# Patient Record
Sex: FEMALE | Race: WHITE | URBAN - METROPOLITAN AREA
[De-identification: names, ages, dates, MRNs, and addresses within clinical notes are randomized per-mention and may not be internally consistent; named-entity substitution may affect disease eponyms.]

---

## 2024-03-05 RX ORDER — MULTIVIT-MIN/IRON/FOLIC ACID/K 18-600-40
1 CAPSULE ORAL DAILY
COMMUNITY

## 2024-03-05 RX ORDER — CHOLECALCIFEROL (VITAMIN D3) 25 MCG
1 TABLET,CHEWABLE ORAL DAILY
COMMUNITY

## 2024-04-02 NOTE — H&P
[facility]   part of [facility]    History and Physical    Dolly Angel Patient Status:  Hospital Outpatient Surgery    1990 MRN [id number]   Location [facility] SURGERY Attending Edward Rogers, Honorio DEL VALLE MD   Hosp Day # 0 PCP PHYSICIAN NONSTAFF     Date:  2024  Date of Admission:  (Not on file)    History provided by:patient  HPI:   No chief complaint on file.    Patient is a 34 yo  F with pelvic pain and endometriosis presenting for surgical intervention.        History     Past Medical History:   Diagnosis Date    History of Achilles tendon repair     History of back surgery     History of eye surgery     Hx of motion sickness     PONV (postoperative nausea and vomiting)     Varicella without mention of complication      Past Surgical History:   Procedure Laterality Date    EYE SURGERY      three times    HYSTEROSCOPY      LAPAROSCOPY,DIAGNOSTIC      LEG/ANKLE SURGERY PROC UNLISTED Right     right ankle ligament repair    SPINE SURGERY PROCEDURE UNLISTED      Scoliosis     History reviewed. No pertinent family history.  Social History:  Social History     Tobacco Use    Smoking status: Never    Smokeless tobacco: Never   Vaping Use    Vaping Use: Never used   Substance and Sexual Activity    Alcohol use: Yes     Comment: Rarely    Drug use: Never   Social History Narrative    ** Merged History Encounter **          Allergies/Medications:   Allergies: No Known Allergies  No medications prior to admission.       Review of Systems:     All other systems reviewed and are negative.      Physical Exam:   Vital Signs:  Height 5' 2\" (1.575 m), weight 125 lb (56.7 kg), last menstrual period 2024.  Physical Exam  Constitutional:       Appearance: Normal appearance.   HENT:      Head: Normocephalic and atraumatic.   Cardiovascular:      Rate and Rhythm: Normal rate and regular rhythm.   Pulmonary:      Effort: Pulmonary effort is normal.      Breath sounds: Normal breath sounds.    Abdominal:      General: Abdomen is flat.      Palpations: Abdomen is soft.   Musculoskeletal:         General: Normal range of motion.   Skin:     General: Skin is warm and dry.   Neurological:      General: No focal deficit present.      Mental Status: She is alert and oriented to person, place, and time. Mental status is at baseline.   Psychiatric:         Mood and Affect: Mood normal.         Behavior: Behavior normal.         Thought Content: Thought content normal.         Judgment: Judgment normal.       Cervical Papanicolaou done within 1 year of adm    Results:   No results found for: \"WBC\", \"HGB\", \"HCT\", \"PLT\", \"CREATSERUM\", \"BUN\", \"NA\", \"K\", \"CL\", \"CO2\", \"GLU\", \"CA\", \"ALB\", \"ALKPHO\", \"BILT\", \"TP\", \"AST\", \"ALT\", \"PTT\", \"INR\", \"PT\", \"T4F\", \"TSH\", \"TSHREFLEX\", \"MARQUEZ\", \"LIP\", \"GGT\", \"PSA\", \"DDIMER\", \"ESRML\", \"ESRPF\", \"CRP\", \"BNP\", \"MG\", \"PHOS\", \"TROP\", \"TROPHS\", \"CK\", \"CKMB\", \"BERTRAND\", \"RPR\", \"B12\", \"ETOH\", \"POCGLU\"  No results found.        Assessment/Plan:   Patient is a 34 yo  F with pelvic pain and endometriosis presenting for surgical intervention.    Plan for hysteroscopy, laparoscopy, excision of endometriosis, tubal lavage, doubtful cannulation of the left fallopian tube     Albert Jansen PA-C  2024

## 2024-04-03 ENCOUNTER — HOSPITAL ENCOUNTER (OUTPATIENT)
Facility: HOSPITAL | Age: 34
Setting detail: HOSPITAL OUTPATIENT SURGERY
Discharge: HOME | End: 2024-04-03
Attending: OBSTETRICS & GYNECOLOGY | Admitting: OBSTETRICS & GYNECOLOGY
Payer: COMMERCIAL

## 2024-04-03 ENCOUNTER — ANESTHESIA EVENT (OUTPATIENT)
Dept: SURGERY | Facility: HOSPITAL | Age: 34
End: 2024-04-03
Payer: COMMERCIAL

## 2024-04-03 ENCOUNTER — ANESTHESIA (OUTPATIENT)
Dept: SURGERY | Facility: HOSPITAL | Age: 34
End: 2024-04-03
Payer: COMMERCIAL

## 2024-04-03 VITALS
DIASTOLIC BLOOD PRESSURE: 59 MMHG | SYSTOLIC BLOOD PRESSURE: 105 MMHG | OXYGEN SATURATION: 99 % | RESPIRATION RATE: 15 BRPM | BODY MASS INDEX: 21.53 KG/M2 | HEIGHT: 62 IN | WEIGHT: 117 LBS | HEART RATE: 73 BPM | TEMPERATURE: 97 F

## 2024-04-03 DIAGNOSIS — G89.18 ACUTE POST-OPERATIVE PAIN: Primary | ICD-10-CM

## 2024-04-03 LAB — B-HCG UR QL: NEGATIVE

## 2024-04-03 PROCEDURE — 0UBF8ZZ EXCISION OF CUL-DE-SAC, VIA NATURAL OR ARTIFICIAL OPENING ENDOSCOPIC: ICD-10-PCS | Performed by: OBSTETRICS & GYNECOLOGY

## 2024-04-03 PROCEDURE — 0UB98ZZ EXCISION OF UTERUS, VIA NATURAL OR ARTIFICIAL OPENING ENDOSCOPIC: ICD-10-PCS | Performed by: OBSTETRICS & GYNECOLOGY

## 2024-04-03 PROCEDURE — 3E0P8KZ INTRODUCTION OF OTHER DIAGNOSTIC SUBSTANCE INTO FEMALE REPRODUCTIVE, VIA NATURAL OR ARTIFICIAL OPENING ENDOSCOPIC: ICD-10-PCS | Performed by: OBSTETRICS & GYNECOLOGY

## 2024-04-03 PROCEDURE — 81025 URINE PREGNANCY TEST: CPT

## 2024-04-03 PROCEDURE — 88305 TISSUE EXAM BY PATHOLOGIST: CPT | Performed by: OBSTETRICS & GYNECOLOGY

## 2024-04-03 RX ORDER — HYDROMORPHONE HYDROCHLORIDE 1 MG/ML
0.6 INJECTION, SOLUTION INTRAMUSCULAR; INTRAVENOUS; SUBCUTANEOUS EVERY 5 MIN PRN
Status: DISCONTINUED | OUTPATIENT
Start: 2024-04-03 | End: 2024-04-03

## 2024-04-03 RX ORDER — PHENAZOPYRIDINE HYDROCHLORIDE 200 MG/1
200 TABLET, FILM COATED ORAL ONCE
Status: COMPLETED | OUTPATIENT
Start: 2024-04-03 | End: 2024-04-03

## 2024-04-03 RX ORDER — BUPIVACAINE HYDROCHLORIDE 5 MG/ML
INJECTION, SOLUTION EPIDURAL; INTRACAUDAL AS NEEDED
Status: DISCONTINUED | OUTPATIENT
Start: 2024-04-03 | End: 2024-04-03

## 2024-04-03 RX ORDER — GLYCOPYRROLATE 0.2 MG/ML
INJECTION, SOLUTION INTRAMUSCULAR; INTRAVENOUS AS NEEDED
Status: DISCONTINUED | OUTPATIENT
Start: 2024-04-03 | End: 2024-04-03 | Stop reason: SURG

## 2024-04-03 RX ORDER — PROCHLORPERAZINE EDISYLATE 5 MG/ML
5 INJECTION INTRAMUSCULAR; INTRAVENOUS EVERY 8 HOURS PRN
Status: DISCONTINUED | OUTPATIENT
Start: 2024-04-03 | End: 2024-04-03

## 2024-04-03 RX ORDER — CEFAZOLIN SODIUM/WATER 2 G/20 ML
SYRINGE (ML) INTRAVENOUS AS NEEDED
Status: DISCONTINUED | OUTPATIENT
Start: 2024-04-03 | End: 2024-04-03 | Stop reason: SURG

## 2024-04-03 RX ORDER — ONDANSETRON 2 MG/ML
4 INJECTION INTRAMUSCULAR; INTRAVENOUS EVERY 6 HOURS PRN
Status: DISCONTINUED | OUTPATIENT
Start: 2024-04-03 | End: 2024-04-03

## 2024-04-03 RX ORDER — HYDROMORPHONE HYDROCHLORIDE 1 MG/ML
0.2 INJECTION, SOLUTION INTRAMUSCULAR; INTRAVENOUS; SUBCUTANEOUS EVERY 5 MIN PRN
Status: DISCONTINUED | OUTPATIENT
Start: 2024-04-03 | End: 2024-04-03

## 2024-04-03 RX ORDER — NEOSTIGMINE METHYLSULFATE 1 MG/ML
INJECTION, SOLUTION INTRAVENOUS AS NEEDED
Status: DISCONTINUED | OUTPATIENT
Start: 2024-04-03 | End: 2024-04-03 | Stop reason: SURG

## 2024-04-03 RX ORDER — HYDROMORPHONE HYDROCHLORIDE 1 MG/ML
0.4 INJECTION, SOLUTION INTRAMUSCULAR; INTRAVENOUS; SUBCUTANEOUS EVERY 5 MIN PRN
Status: DISCONTINUED | OUTPATIENT
Start: 2024-04-03 | End: 2024-04-03

## 2024-04-03 RX ORDER — ONDANSETRON 2 MG/ML
INJECTION INTRAMUSCULAR; INTRAVENOUS AS NEEDED
Status: DISCONTINUED | OUTPATIENT
Start: 2024-04-03 | End: 2024-04-03 | Stop reason: SURG

## 2024-04-03 RX ORDER — SODIUM CHLORIDE, SODIUM LACTATE, POTASSIUM CHLORIDE, CALCIUM CHLORIDE 600; 310; 30; 20 MG/100ML; MG/100ML; MG/100ML; MG/100ML
INJECTION, SOLUTION INTRAVENOUS CONTINUOUS
Status: DISCONTINUED | OUTPATIENT
Start: 2024-04-03 | End: 2024-04-03

## 2024-04-03 RX ORDER — ONDANSETRON 8 MG/1
8 TABLET, ORALLY DISINTEGRATING ORAL EVERY 8 HOURS PRN
Qty: 10 TABLET | Refills: 0 | Status: SHIPPED | OUTPATIENT
Start: 2024-04-03

## 2024-04-03 RX ORDER — NALOXONE HYDROCHLORIDE 0.4 MG/ML
0.08 INJECTION, SOLUTION INTRAMUSCULAR; INTRAVENOUS; SUBCUTANEOUS AS NEEDED
Status: DISCONTINUED | OUTPATIENT
Start: 2024-04-03 | End: 2024-04-03

## 2024-04-03 RX ORDER — MIDAZOLAM HYDROCHLORIDE 1 MG/ML
INJECTION INTRAMUSCULAR; INTRAVENOUS AS NEEDED
Status: DISCONTINUED | OUTPATIENT
Start: 2024-04-03 | End: 2024-04-03 | Stop reason: SURG

## 2024-04-03 RX ORDER — HYDROCODONE BITARTRATE AND ACETAMINOPHEN 5; 325 MG/1; MG/1
1 TABLET ORAL ONCE AS NEEDED
Status: DISCONTINUED | OUTPATIENT
Start: 2024-04-03 | End: 2024-04-03

## 2024-04-03 RX ORDER — OXYCODONE HYDROCHLORIDE 5 MG/1
5 TABLET ORAL EVERY 4 HOURS PRN
Qty: 10 TABLET | Refills: 0 | Status: SHIPPED | OUTPATIENT
Start: 2024-04-03

## 2024-04-03 RX ORDER — ROCURONIUM BROMIDE 10 MG/ML
INJECTION, SOLUTION INTRAVENOUS AS NEEDED
Status: DISCONTINUED | OUTPATIENT
Start: 2024-04-03 | End: 2024-04-03 | Stop reason: SURG

## 2024-04-03 RX ORDER — HYDROCODONE BITARTRATE AND ACETAMINOPHEN 5; 325 MG/1; MG/1
2 TABLET ORAL ONCE AS NEEDED
Status: DISCONTINUED | OUTPATIENT
Start: 2024-04-03 | End: 2024-04-03

## 2024-04-03 RX ORDER — ACETAMINOPHEN 500 MG
1000 TABLET ORAL ONCE AS NEEDED
Status: DISCONTINUED | OUTPATIENT
Start: 2024-04-03 | End: 2024-04-03

## 2024-04-03 RX ORDER — DEXAMETHASONE SODIUM PHOSPHATE 4 MG/ML
VIAL (ML) INJECTION AS NEEDED
Status: DISCONTINUED | OUTPATIENT
Start: 2024-04-03 | End: 2024-04-03 | Stop reason: SURG

## 2024-04-03 RX ORDER — ACETAMINOPHEN 500 MG
1000 TABLET ORAL ONCE
Status: DISCONTINUED | OUTPATIENT
Start: 2024-04-03 | End: 2024-04-03 | Stop reason: HOSPADM

## 2024-04-03 RX ORDER — SCOLOPAMINE TRANSDERMAL SYSTEM 1 MG/1
1 PATCH, EXTENDED RELEASE TRANSDERMAL ONCE
Status: DISCONTINUED | OUTPATIENT
Start: 2024-04-03 | End: 2024-04-03 | Stop reason: HOSPADM

## 2024-04-03 RX ORDER — LIDOCAINE HYDROCHLORIDE 10 MG/ML
INJECTION, SOLUTION EPIDURAL; INFILTRATION; INTRACAUDAL; PERINEURAL AS NEEDED
Status: DISCONTINUED | OUTPATIENT
Start: 2024-04-03 | End: 2024-04-03 | Stop reason: SURG

## 2024-04-03 RX ADMIN — DEXAMETHASONE SODIUM PHOSPHATE 4 MG: 4 MG/ML VIAL (ML) INJECTION at 10:28:00

## 2024-04-03 RX ADMIN — NEOSTIGMINE METHYLSULFATE 4 MG: 1 INJECTION, SOLUTION INTRAVENOUS at 11:01:00

## 2024-04-03 RX ADMIN — SODIUM CHLORIDE, SODIUM LACTATE, POTASSIUM CHLORIDE, CALCIUM CHLORIDE: 600; 310; 30; 20 INJECTION, SOLUTION INTRAVENOUS at 10:11:00

## 2024-04-03 RX ADMIN — CEFAZOLIN SODIUM/WATER 2 G: 2 G/20 ML SYRINGE (ML) INTRAVENOUS at 10:29:00

## 2024-04-03 RX ADMIN — GLYCOPYRROLATE 0.6 MG: 0.2 INJECTION, SOLUTION INTRAMUSCULAR; INTRAVENOUS at 11:01:00

## 2024-04-03 RX ADMIN — SODIUM CHLORIDE, SODIUM LACTATE, POTASSIUM CHLORIDE, CALCIUM CHLORIDE: 600; 310; 30; 20 INJECTION, SOLUTION INTRAVENOUS at 10:51:00

## 2024-04-03 RX ADMIN — ROCURONIUM BROMIDE 40 MG: 10 INJECTION, SOLUTION INTRAVENOUS at 10:17:00

## 2024-04-03 RX ADMIN — MIDAZOLAM HYDROCHLORIDE 2 MG: 1 INJECTION INTRAMUSCULAR; INTRAVENOUS at 10:15:00

## 2024-04-03 RX ADMIN — LIDOCAINE HYDROCHLORIDE 50 MG: 10 INJECTION, SOLUTION EPIDURAL; INFILTRATION; INTRACAUDAL; PERINEURAL at 10:17:00

## 2024-04-03 RX ADMIN — ONDANSETRON 4 MG: 2 INJECTION INTRAMUSCULAR; INTRAVENOUS at 10:28:00

## 2024-04-03 RX ADMIN — SODIUM CHLORIDE, SODIUM LACTATE, POTASSIUM CHLORIDE, CALCIUM CHLORIDE: 600; 310; 30; 20 INJECTION, SOLUTION INTRAVENOUS at 11:21:00

## 2024-04-03 NOTE — INTERVAL H&P NOTE
Pre-op Diagnosis: PELVIC PAIN    The above referenced H&P was reviewed by Albert Jansen PA-C on 4/3/2024, the patient was examined and no significant changes have occurred in the patient's condition since the H&P was performed.  I discussed with the patient and/or legal representative the potential benefits, risks and side effects of this procedure; the likelihood of the patient achieving goals; and potential problems that might occur during recuperation.  I discussed reasonable alternatives to the procedure, including risks, benefits and side effects related to the alternatives and risks related to not receiving this procedure.  We will proceed with procedure as planned.

## 2024-04-03 NOTE — ANESTHESIA PREPROCEDURE EVALUATION
PRE-OP EVALUATION    Patient Name: Dolly Rosado    Admit Diagnosis: PELVIC PAIN    Pre-op Diagnosis: PELVIC PAIN    HYSTEROSCOPY, LAPAROSCOPY, EXCISION OF ENDOMETRIOSIS, POSSIBLE CANNULATION LEFT FALLOPIAN TUBE    Anesthesia Procedure: HYSTEROSCOPY, LAPAROSCOPY, EXCISION OF ENDOMETRIOSIS, POSSIBLE CANNULATION LEFT FALLOPIAN TUBE (Left)    Surgeon(s) and Role:     * Edward Rogers, Honorio DEL VALLE MD - Primary     * Leticia Zaman MD - Assisting Surgeon    Pre-op vitals reviewed.  Temp: 97.9 °F (36.6 °C)  Pulse: 77  Resp: 16  BP: 105/75  SpO2: 100 %  Body mass index is 21.4 kg/m².    Current medications reviewed.  Hospital Medications:  • [MAR Hold] acetaminophen (Tylenol Extra Strength) tab 1,000 mg  1,000 mg Oral Once   • [MAR Hold] scopolamine (Transderm-Scop) 1 MG/3DAYS patch 1 patch  1 patch Transdermal Once   • lactated ringers infusion   Intravenous Continuous   • [COMPLETED] phenazopyridine (Pyridum) tab 200 mg  200 mg Oral Once       Outpatient Medications:     Medications Prior to Admission   Medication Sig Dispense Refill Last Dose   • prenatal vitamin with DHA 27-0.8-228 MG Oral Cap Take 1 capsule by mouth daily.   Past Week   • Coenzyme Q10 (COQ10 OR) Take 1 capsule by mouth daily.   4/2/2024   • Cholecalciferol (VITAMIN D) 50 MCG (2000 UT) Oral Cap Take 1 capsule (2,000 Units total) by mouth daily.   Past Month   • COLLAGEN OR Take 1 capsule by mouth daily.      • Gamma-Aminobutyric Acid (TYLER OR) Take 1 capsule by mouth daily.   4/2/2024   • LANEY OR daily          Allergies: Patient has no known allergies.      Anesthesia Evaluation    Patient summary reviewed.    Anesthetic Complications  (+) history of anesthetic complications  History of: PONV       GI/Hepatic/Renal    Negative GI/hepatic/renal ROS.                             Cardiovascular    Negative cardiovascular ROS.                                                   Endo/Other  Comment: Pelvic pain for hysteroscopy and laparoscopy                                 Pulmonary    Negative pulmonary ROS.                       Neuro/Psych  Comment: H/o spine surgery                                  Past Surgical History:   Procedure Laterality Date   • EYE SURGERY      three times   • HYSTEROSCOPY     • LAPAROSCOPY,DIAGNOSTIC  2007   • LEG/ANKLE SURGERY PROC UNLISTED Right     right ankle ligament repair   • SPINE SURGERY PROCEDURE UNLISTED      Scoliosis     Social History     Socioeconomic History   • Marital status:    Tobacco Use   • Smoking status: Never   • Smokeless tobacco: Never   Vaping Use   • Vaping Use: Never used   Substance and Sexual Activity   • Alcohol use: Yes     Comment: Rarely   • Drug use: Never     History   Drug Use Unknown     Available pre-op labs reviewed.               Airway      Mallampati: II  Mouth opening: >3 FB  TM distance: > 6 cm  Neck ROM: full Cardiovascular    Cardiovascular exam normal.  Rhythm: regular  Rate: normal  (-) murmur   Dental    Dentition appears grossly intact         Pulmonary    Pulmonary exam normal.  Breath sounds clear to auscultation bilaterally.               Other findings          ASA: 2   Plan: general  NPO status verified and patient meets guidelines.        Comment: GA with OETT/LMA explained to patient. Risks/benefits explained including but not limited to sore throat, hoarse voice, nausea, dental trauma, eye injury,pulmonary complication and other complications as discussed in anesthesia consent form. Patient agrees to proceed. Anesthesia consent signed.     Plan/risks discussed with: patient            Present on Admission:  **None**

## 2024-04-03 NOTE — BRIEF OP NOTE
Pre-Operative Diagnosis: PELVIC PAIN     Post-Operative Diagnosis: PELVIC PAIN      Procedure Performed:   HYSTEROSCOPY, POLYPECTOMY, LAPAROSCOPY, EXCISION OF ENDOMETRIOSIS, TUBAL LAVAGE    Surgeon(s) and Role:     * Edward Rogers, Honorio DEL VALLE MD - Primary     * Leticia Zaman MD - Assisting Surgeon    Assistant(s):  PA: Albert Jansen PA-C     Surgical Findings: see post op diagnosis     Specimen: polyp, endometriosis     Estimated Blood Loss: Blood Output: 5 mL (4/3/2024 10:59 AM)          Albert Jansen PA-C  4/3/2024  11:02 AM

## 2024-04-03 NOTE — ANESTHESIA POSTPROCEDURE EVALUATION
[facility]    Dolly Rosado Patient Status:  Hospital Outpatient Surgery   Age/Gender 33 year old female MRN [id number]   Location [facility] SURGERY Attending Edward Rogers, Honorio DEL VALLE MD   Hosp Day # 0 PCP PHYSICIAN NONSTAFF       Anesthesia Post-op Note    HYSTEROSCOPY, POLYPECTOMY, LAPAROSCOPY, EXCISION OF ENDOMETRIOSIS, TUBAL LAVAGE    Procedure Summary       Date: 04/03/24 Room / Location:  MAIN OR 14 / [facility] MAIN OR    Anesthesia Start: 1011 Anesthesia Stop: 1121    Procedure: HYSTEROSCOPY, POLYPECTOMY, LAPAROSCOPY, EXCISION OF ENDOMETRIOSIS, TUBAL LAVAGE Diagnosis: (PELVIC PAIN)    Surgeons: Edward Rogers, Honorio DEL VALLE MD Anesthesiologist: Jered Parekh MD    Anesthesia Type: general ASA Status: 2            Anesthesia Type: general    Vitals Value Taken Time   /71 04/03/24 1122   Temp 98.6 04/03/24 1122   Pulse 100 04/03/24 1122   Resp 18 04/03/24 1122   SpO2 100 04/03/24 1122       Patient Location: PACU    Anesthesia Type: general    Airway Patency: patent and extubated    Postop Pain Control: adequate    Mental Status: preanesthetic baseline    Nausea/Vomiting: none    Cardiopulmonary/Hydration status: stable euvolemic    Complications: no apparent anesthesia related complications    Postop vital signs: stable    Dental Exam: Unchanged from Preop    Patient to be discharged from PACU when criteria met.

## 2024-04-03 NOTE — OPERATIVE REPORT
[facility]    PATIENT'S NAME: LINA LYNCH   ATTENDING PHYSICIAN: Honorio Leon M.D.   OPERATING PHYSICIAN: Honorio Leon M.D.   PATIENT ACCOUNT#:   [id number]    LOCATION:  [facility]  MEDICAL RECORD #:   [id number]       YOB: 1990  ADMISSION DATE:       04/03/2024      OPERATION DATE:  04/03/2024    OPERATIVE REPORT      PREOPERATIVE DIAGNOSIS:    1.   Left proximal tubal occlusion.  2.   Endometrial polyps.  3.   Endometriosis.  4.   Pelvic adhesions.  POSTOPERATIVE DIAGNOSIS:    1.   Endometrial polyps.  2.   Patent fallopian tubes.  3.   Left periadnexal adhesions.  4.   Stage I endometriosis.  PROCEDURE:  Examination under anesthesia, hysteroscopy, polypectomy with the TruClear mini soft blade, operative laparoscopy, lysis of left periadnexal adhesions, chromopertubation, excision of stage I endometriosis with Sonicision.    ASSISTANT SURGEON:  Leticia Zaman MD    ASSISTANT:  Carolann Jansen PA-C     ANESTHESIA:  General endotracheal anesthesia.    ESTIMATED BLOOD LOSS:  5 mL.    COMPLICATIONS:  None.    FINDINGS:  On examination under anesthesia, there was noted to be a normal size uterus.  Uterus sounds to 8 cm.  Hysteroscopy shows a polyp in the lower uterine segment anteriorly.  Both tubal ostia appeared normal.  Ultimately, this polyp was removed with the TruClear Mini Soft Blade.  On operative laparoscopy, there was noted to be a normal liver edge, gallbladder, normal appendix.  There were noted to be periadnexal adhesions over the left adnexa.  This was caused by the adhesions to sigmoid.  Once these adhesions were removed and the sigmoid mobilized, the left adnexa could be mobilized, and there was noted to be a normal-appearing ovary and fallopian tube with excellent fimbrial development.  On the right, there was noted to be a normal ovary and fallopian tube with excellent fimbrial development, and on chromopertubation, bilateral fill and spill  was now noted.  The uterus was normal size and completely mobile.    Endometriosis was noted in the cul-de-sac.  This was superficial.  It ultimately was excised completely.    OPERATIVE TECHNIQUE:  The patient was intubated for the purpose of general endotracheal anesthesia, prepped and draped in usual manner for laparoscopy.  Bladder catheterized.  The patient was examined under anesthesia with findings noted above.  Weighted speculum introduced into the vaginal vault.  The anterior lip of cervix was grasped with a single-tooth tenaculum.  Uterus sounds to 8 cm.  Hysteroscopy was now performed.  The polyp noted and removed with TruClear Mini Soft Blade.  A Jarcho cannula placed.    Attention was now directed toward the umbilicus.  The umbilicus was everted with the with Allis clamp, 2 towel clamps placed on either side of the umbilicus.   Small rent made mid umbilical.  Through this, a Veress needle was placed.  Insufflation started with carbon dioxide gas.  When the pressure reached 14 mmHg, the Veress needle was removed and replaced by laparoscopy trocar and sleeve.  One side trocar was removed and replaced by laparoscope.  Second and third punctures were now placed laterally under direct visualization.  Findings were as noted above.  Initially, lysis of adhesions was performed.  This allowed us to mobilize the sigmoid off the left pelvic sidewall and the left adnexa.  Now, the left adnexa and the right adnexa could both be visualized.  Chromopertubation revealed bilateral fill and spill from the fallopian tubes.  Finally, the endometriosis was noted in the cul-de-sac and excised.  The area removed was approximately 1.5 x 1 cm.  This was noted to be superficial disease.    At the end of the procedure, the patient was awakened and taken to the recovery room in satisfactory condition after incisions were closed in usual fashion.    Dictated By Honorio Leon M.D.  d: 04/03/2024 11:05:29  t: 04/03/2024  14:28:05  Job [id number]  CM/

## 2024-04-03 NOTE — DISCHARGE INSTRUCTIONS
No heavy lifting (>10-15 lbs), no strenuous activity and pelvic rest X 2 weeks  No driving until not taking narcotic medication (usually at least 1 week)  Remove tegaderms in 1 week, unless water gets underneath of them, then remove immediately and place a band-aid over the incision  Remove steri-strips in 2 weeks  You may shower  Walk around daily

## 2024-04-03 NOTE — ANESTHESIA PROCEDURE NOTES
Airway  Date/Time: 4/3/2024 10:19 AM  Urgency: elective    Airway not difficult    General Information and Staff    Patient location during procedure: OR  Anesthesiologist: Jered Parekh MD  Performed: anesthesiologist   Performed by: Jered Parekh MD  Authorized by: Jered Parekh MD      Indications and Patient Condition  Indications for airway management: anesthesia  Sedation level: deep  Preoxygenated: yes  Patient position: sniffing  Mask difficulty assessment: 1 - vent by mask    Final Airway Details  Final airway type: endotracheal airway      Successful airway: ETT  Cuffed: yes   Successful intubation technique: Video laryngoscopy  Facilitating devices/methods: intubating stylet  Endotracheal tube insertion site: oral  Blade: GlideScope  Blade size: #3  ETT size (mm): 7.0    Cormack-Lehane Classification: grade I - full view of glottis  Placement verified by: capnometry   Measured from: lips  ETT to lips (cm): 21  Number of attempts at approach: 1

## (undated) DEVICE — ADHESIVE LIQ 2/3ML VI MASTISOL

## (undated) DEVICE — GYN LAP/ROBOTIC: Brand: MEDLINE INDUSTRIES, INC.

## (undated) DEVICE — SOLUTION IRRIG 3000ML 0.9% NACL FLX CONT

## (undated) DEVICE — SUT MCRYL 4-0 18IN PS-2 ABSRB UD 19MM 3/8 CIR

## (undated) DEVICE — ENDOPATH 5MM CURVED SCISSORS WITH MONOPOLAR CAUTERY: Brand: ENDOPATH

## (undated) DEVICE — 3M™ TEGADERM™ +PAD FILM DRESSING WITH NON-ADHERENT PAD, 3587, 3-1/2 IN X 4-1/8 IN (9 CM X 10.5 CM), 25/CAR, 4 CAR/CS: Brand: 3M™ TEGADERM™

## (undated) DEVICE — 40580 - THE PINK PAD - ADVANCED TRENDELENBURG POSITIONING KIT: Brand: 40580 - THE PINK PAD - ADVANCED TRENDELENBURG POSITIONING KIT

## (undated) DEVICE — GLOVE SUR 7 SENSICARE PI PIP CRM PWD F

## (undated) DEVICE — GLOVE SUR 6.5 SENSICARE PI PIP CRM PWD F

## (undated) DEVICE — GLOVE SUR 6 SENSICARE PI PIP CRM PWD F

## (undated) DEVICE — SOLUTION PREP 26ML 0.7% POVACRYLEX 74% ISO

## (undated) DEVICE — LACTATED R INJ

## (undated) DEVICE — GLOVE SUR 7.5 SENSICARE PI PIP CRM PWD F

## (undated) DEVICE — [HIGH FLOW INSUFFLATOR,  DO NOT USE IF PACKAGE IS DAMAGED,  KEEP DRY,  KEEP AWAY FROM SUNLIGHT,  PROTECT FROM HEAT AND RADIOACTIVE SOURCES.]: Brand: PNEUMOSURE

## (undated) DEVICE — INSUFFLATION NEEDLE TO ESTABLISH PNEUMOPERITONEUM.: Brand: INSUFFLATION NEEDLE

## (undated) DEVICE — TROCAR: Brand: KII FIOS FIRST ENTRY

## (undated) DEVICE — TROCAR: Brand: KII® SLEEVE

## (undated) DEVICE — SOLUTION IRRIG 1000ML 0.9% NACL USP BTL

## (undated) DEVICE — LAPAROVUE VISIBILITY SYSTEM LAPAROSCOPIC SOLUTIONS: Brand: LAPAROVUE

## (undated) DEVICE — 3M™ STERI-STRIP™ REINFORCED ADHESIVE SKIN CLOSURES, R1547, 1/2 IN X 4 IN (12 MM X 100 MM), 6 STRIPS/ENVELOPE: Brand: 3M™ STERI-STRIP™

## (undated) DEVICE — PLUMEPORT ACTIV LAPAROSCOPIC SMOKE FILTRATION DEVICE: Brand: PLUMEPORT ACTIVE

## (undated) NOTE — LETTER
OUTSIDE TESTING RESULT REQUEST     IMPORTANT: FOR YOUR IMMEDIATE ATTENTION  Please FAX all test results listed below to: [phone]     Testing already done on or about: 3/08/2024     * * * * If testing is NOT complete, arrange with patient A.S.A.P. * * * *      Patient Name: Dolly Rosado  Surgery Date: 4/3/2024  Medical Record: [id number]  CSN: [id number]  : 1990 - A: 33 y     Sex: female  Surgeon(s):  Edward Rogers, MD Austyn Gibson Kirsten, MD  Procedure: HYSTEROSCOPY, LAPAROSCOPY, EXCISION OF ENDOMETRIOSIS, POSSIBLE CANNULATION LEFT FALLOPIAN TUBE  Anesthesia Type: General     Surgeon: Edward Rogers, Honorio DEL VALLE MD     The following Testing and Time Line are REQUIRED PER ANESTHESIA     CBC [with Differential & Platelets] within  90 days  CMP (requires 4 hour fast) within  90 days  Hepatitis B Antigen   Hepatitis C (HCV Antibody)  HIV 1/2 Single Assay   b-HCG Quant.      Thank You,   Sent by: KEITH Major